# Patient Record
Sex: FEMALE | Race: WHITE | Employment: FULL TIME | URBAN - METROPOLITAN AREA
[De-identification: names, ages, dates, MRNs, and addresses within clinical notes are randomized per-mention and may not be internally consistent; named-entity substitution may affect disease eponyms.]

---

## 2017-05-07 ENCOUNTER — HOSPITAL ENCOUNTER (EMERGENCY)
Age: 62
Discharge: HOME | End: 2017-05-07
Attending: EMERGENCY MEDICINE
Payer: COMMERCIAL

## 2017-05-07 ENCOUNTER — APPOINTMENT (OUTPATIENT)
Dept: ULTRASOUND IMAGING | Age: 62
End: 2017-05-07
Attending: EMERGENCY MEDICINE
Payer: COMMERCIAL

## 2017-05-07 ENCOUNTER — APPOINTMENT (OUTPATIENT)
Dept: GENERAL RADIOLOGY | Age: 62
End: 2017-05-07
Attending: EMERGENCY MEDICINE
Payer: COMMERCIAL

## 2017-05-07 VITALS
OXYGEN SATURATION: 96 % | SYSTOLIC BLOOD PRESSURE: 98 MMHG | DIASTOLIC BLOOD PRESSURE: 60 MMHG | RESPIRATION RATE: 16 BRPM | TEMPERATURE: 99 F | HEART RATE: 104 BPM

## 2017-05-07 DIAGNOSIS — M54.31 SCIATICA OF RIGHT SIDE: ICD-10-CM

## 2017-05-07 DIAGNOSIS — M79.604 RIGHT LEG PAIN: Primary | ICD-10-CM

## 2017-05-07 LAB — D DIMER PPP FEU-MCNC: 1.1 MG/L FEU (ref 0–0.65)

## 2017-05-07 PROCEDURE — 96375 TX/PRO/DX INJ NEW DRUG ADDON: CPT

## 2017-05-07 PROCEDURE — 73562 X-RAY EXAM OF KNEE 3: CPT

## 2017-05-07 PROCEDURE — 85379 FIBRIN DEGRADATION QUANT: CPT | Performed by: EMERGENCY MEDICINE

## 2017-05-07 PROCEDURE — 96374 THER/PROPH/DIAG INJ IV PUSH: CPT

## 2017-05-07 PROCEDURE — 99284 EMERGENCY DEPT VISIT MOD MDM: CPT

## 2017-05-07 PROCEDURE — 74011250636 HC RX REV CODE- 250/636: Performed by: EMERGENCY MEDICINE

## 2017-05-07 PROCEDURE — 93971 EXTREMITY STUDY: CPT

## 2017-05-07 PROCEDURE — 36415 COLL VENOUS BLD VENIPUNCTURE: CPT | Performed by: EMERGENCY MEDICINE

## 2017-05-07 RX ORDER — HYDROMORPHONE HYDROCHLORIDE 2 MG/1
1 TABLET ORAL
Qty: 10 TAB | Refills: 0 | Status: SHIPPED | OUTPATIENT
Start: 2017-05-07

## 2017-05-07 RX ORDER — ONDANSETRON 2 MG/ML
4 INJECTION INTRAMUSCULAR; INTRAVENOUS
Status: COMPLETED | OUTPATIENT
Start: 2017-05-07 | End: 2017-05-07

## 2017-05-07 RX ORDER — MELATONIN
DAILY
COMMUNITY

## 2017-05-07 RX ORDER — ESTRADIOL 0.1 MG/G
2 CREAM VAGINAL DAILY
COMMUNITY

## 2017-05-07 RX ORDER — HYDROMORPHONE HYDROCHLORIDE 1 MG/ML
0.2 INJECTION, SOLUTION INTRAMUSCULAR; INTRAVENOUS; SUBCUTANEOUS
Status: COMPLETED | OUTPATIENT
Start: 2017-05-07 | End: 2017-05-07

## 2017-05-07 RX ORDER — KETOROLAC TROMETHAMINE 30 MG/ML
30 INJECTION, SOLUTION INTRAMUSCULAR; INTRAVENOUS
Status: COMPLETED | OUTPATIENT
Start: 2017-05-07 | End: 2017-05-07

## 2017-05-07 RX ORDER — DIAZEPAM 10 MG/2ML
5 INJECTION INTRAMUSCULAR
Status: COMPLETED | OUTPATIENT
Start: 2017-05-07 | End: 2017-05-07

## 2017-05-07 RX ORDER — AZELASTINE 1 MG/ML
1 SPRAY, METERED NASAL 2 TIMES DAILY
COMMUNITY

## 2017-05-07 RX ORDER — ONDANSETRON 4 MG/1
4 TABLET, FILM COATED ORAL
Qty: 20 TAB | Refills: 0 | Status: SHIPPED | OUTPATIENT
Start: 2017-05-07

## 2017-05-07 RX ADMIN — KETOROLAC TROMETHAMINE 30 MG: 30 INJECTION, SOLUTION INTRAMUSCULAR at 09:38

## 2017-05-07 RX ADMIN — HYDROMORPHONE HYDROCHLORIDE 0.2 MG: 1 INJECTION, SOLUTION INTRAMUSCULAR; INTRAVENOUS; SUBCUTANEOUS at 10:21

## 2017-05-07 RX ADMIN — DIAZEPAM 5 MG: 5 INJECTION, SOLUTION INTRAMUSCULAR; INTRAVENOUS at 09:37

## 2017-05-07 RX ADMIN — ONDANSETRON HYDROCHLORIDE 4 MG: 2 INJECTION, SOLUTION INTRAMUSCULAR; INTRAVENOUS at 10:21

## 2017-05-07 NOTE — LETTER
[place] 
[facility] EMERGENCY DEPT 
[address] [place] [address] [address] [phone] 
[phone] Work/School Note Date: 5/7/2017 To Whom It May concern: 
 
Shiraz Farrar was seen and treated today in the emergency room by the following provider(s): 
Attending Provider: Gem Pickett MD. Shiraz Farrar may return to work on 05/08/2017. Please allow her to use crutches as needed. Sincerely, Gem Pickett MD

## 2017-05-07 NOTE — ED NOTES
patient received discharge instructions from MD and  Prescriptions; VSS; neuro intact; declined wheelchair assistance from ED

## 2017-05-07 NOTE — ED NOTES
Pt resting in bed, friend at bedside, states pain medicine has helped and she is able to straighten left leg more now.

## 2017-05-07 NOTE — ED PROVIDER NOTES
HPI Comments: Fransisco Rashid is a 62 y.o. female who presents ambulatory to [facility] ED with cc of right leg pain extending from the hip down to the right foot. Pt states that she has been walking with a limp for the past three weeks secondary to pain, but notes acute exacerbation of symptoms since last night. She states that the pain has since become \"excruciating 10/10\" and she has been unable to straighten her knee or ambulate. Pt also notes some associated numbness to the right heel. Pt endorses hx of lower back pain and spinal stenosis and states that she is regularly evaluated by Dr. Elvia Clinton, Neurosurgery, with MRI scheduled this week. However, she denies any back pain at this time. She also denies any recent falls, injury, or trauma to the area or any recent surgery or long travel. Pt denies taking any medication for relief of pain and specifically denies any abdominal pain, chest pain, SOB, fever, or chills. Jose Da Silva MD  Neurosurgery: Dr. Elvia Clinton    PMHx: arthritis  Social Hx: - smoking; - EtOH; - illicit drug use    There are no other complains, changes, or physical findings at this time. The history is provided by the patient. No  was used. No past medical history on file. No past surgical history on file. No family history on file. Social History     Social History    Marital status:      Spouse name: N/A    Number of children: N/A    Years of education: N/A     Occupational History    Not on file. Social History Main Topics    Smoking status: Not on file    Smokeless tobacco: Not on file    Alcohol use Not on file    Drug use: Not on file    Sexual activity: Not on file     Other Topics Concern    Not on file     Social History Narrative         ALLERGIES: Codeine; Morphine; and Prednisone    Review of Systems   Constitutional: Negative for chills and fever. HENT: Negative for congestion. Eyes: Negative.     Respiratory: Negative for shortness of breath. Cardiovascular: Negative for chest pain. Gastrointestinal: Negative for abdominal pain. Endocrine: Negative. Genitourinary: Negative for dysuria. Musculoskeletal: Positive for arthralgias, gait problem and myalgias. Negative for back pain. Skin: Negative for rash. Allergic/Immunologic: Negative for immunocompromised state. Neurological: Positive for numbness. Psychiatric/Behavioral: Negative for confusion. Vitals:    05/07/17 0856   BP: 156/90   Pulse: (!) 104   Resp: 16   Temp: 99 °F (37.2 °C)   SpO2: 97%            Physical Exam   Constitutional: She is oriented to person, place, and time. She appears well-developed and well-nourished. She appears distressed (mild to moderate). HENT:   Head: Normocephalic and atraumatic. Eyes: EOM are normal.   Neck: Normal range of motion. Neck supple. Cardiovascular: Normal rate, regular rhythm, normal heart sounds and intact distal pulses. Pulmonary/Chest: Effort normal and breath sounds normal. No respiratory distress. Abdominal: Soft. Bowel sounds are normal. She exhibits no mass. There is no tenderness. Musculoskeletal:   Mild tenderness to the right posterior knee. Moderate tenderness to the right thigh  Mild calf tenderness with no edema  Decreased ROM of the RLE   Neurological: She is alert and oriented to person, place, and time. No sensory deficit. Coordination normal.   Skin: Skin is warm and dry. Psychiatric: She has a normal mood and affect. Nursing note and vitals reviewed.        MDM  Number of Diagnoses or Management Options  Right leg pain:   Sciatica of right side:   Diagnosis management comments: DDx: arthritis, DVT, Baker's cyst, sciatica, musculoskeletal pain, pathological fracture       Amount and/or Complexity of Data Reviewed  Clinical lab tests: ordered and reviewed  Tests in the radiology section of CPT®: ordered and reviewed  Review and summarize past medical records: yes    Patient Progress  Patient progress: stable    ED Course       Procedures    Progress Note:  9:57 AM  Pt states that pain is now down to 8/10. Progress Note:  11:00 AM  Pt states that pain is now a 5/10 and she is able to extend her leg flat. Refusing any more pain medication. Progress Note:  11:44 AM  Pt defers knee immobilizer, but would like crutches. She is also refusing steroids at this time and plans to follow up with her MRI appointment in four days. LABORATORY TESTS:  Recent Results (from the past 12 hour(s))   D DIMER    Collection Time: 05/07/17  9:41 AM   Result Value Ref Range    D-dimer 1.10 (H) 0.00 - 0.65 mg/L FEU       IMAGING RESULTS:  EXAM: XR KNEE RT 3 V     INDICATION: pain. Right leg pain and numbness for 3 weeks, with no reported  trauma.     COMPARISON: None.     FINDINGS: Three views of the right knee demonstrate no fracture or other acute  osseous or articular abnormality. There is no effusion.     IMPRESSION  IMPRESSION: No acute abnormality.                   History: Right leg pain for 3 weeks.     Duplex venous Doppler examination was performed from the right inguinal ligament  to the mid-calf. Deep venous structures were well imaged and appeared  compressible throughout. Both wave form and color flow analysis demonstrated  normal flow patterns. Augmentation was demonstrable.     IMPRESSION  IMPRESSION:  NORMAL DUPLEX VENOUS DOPPLER EXAMINATION.                        VITALS:  Patient Vitals for the past 12 hrs:   Temp Pulse Resp BP SpO2   05/07/17 1045 - - - 106/66 96 %   05/07/17 1015 - - - 108/66 94 %   05/07/17 1000 - - - 108/65 94 %   05/07/17 0915 - - - 121/73 97 %   05/07/17 0856 99 °F (37.2 °C) (!) 104 16 156/90 97 %       MEDICATIONS GIVEN:  Medications   diazePAM (VALIUM) injection 5 mg (5 mg IntraVENous Given 5/7/17 0937)   ketorolac (TORADOL) injection 30 mg (30 mg IntraVENous Given 5/7/17 0938)   HYDROmorphone (PF) (DILAUDID) injection 0.2 mg (0.2 mg IntraVENous Given 5/7/17 1021)   ondansetron (ZOFRAN) injection 4 mg (4 mg IntraVENous Given 5/7/17 1021)       IMPRESSION:  1. Right leg pain    2. Sciatica of right side        PLAN:  1. Current Discharge Medication List      START taking these medications    Details   HYDROmorphone (DILAUDID) 2 mg tablet Take 0.5 Tabs by mouth every four (4) hours as needed for Pain. Max Daily Amount: 6 mg. Qty: 10 Tab, Refills: 0      ondansetron hcl (ZOFRAN, AS HYDROCHLORIDE,) 4 mg tablet Take 1 Tab by mouth every eight (8) hours as needed for Nausea. Qty: 20 Tab, Refills: 0           2. Follow-up Information     Follow up With Details Comments Contact Info    Radha Ye MD  as scheduled [address] [facility] [address] [facility]      Jennifer Mendoza MD In 2 days As needed [address] [facility] [address] [phone]      [facility] EMERGENCY DEPT  If symptoms worsen [address] [facility]  [address] [facility]  [phone]        3. Return to ED if worse     Discharge Note:  11:51 AM  The patient has been re-evaluated and is ready for discharge. Reviewed available results with patient. Counseled patient on diagnosis and care plan. Patient has expressed understanding, and all questions have been answered. Patient agrees with plan and agrees to follow up as recommended, or to return to the ED if their symptoms worsen. Discharge instructions have been provided and explained to the patient, along with reasons to return to the ED. This note is prepared by Karin Gonzalez, acting as Scribe for Governalex Garcia MD.    Governor MD Jose: The scribe's documentation has been prepared under my direction and personally reviewed by me in its entirety. I confirm that the note above accurately reflects all work, treatment, procedures, and medical decision making performed by me.

## 2017-05-07 NOTE — ED NOTES
Received pt to exam room, resting on stretcher in position of comfort, call bell given to pt; pt reports worsening right leg/knee pain x 3 weeks, denies injury, over the last 3 days has been limping and this morning unable to straighten or weight bear on her right leg

## 2017-05-13 ENCOUNTER — HOSPITAL ENCOUNTER (OUTPATIENT)
Dept: MRI IMAGING | Age: 62
Discharge: HOME | End: 2017-05-13
Attending: SPECIALIST
Payer: COMMERCIAL

## 2017-05-13 DIAGNOSIS — R29.898 RIGHT LEG WEAKNESS: ICD-10-CM

## 2017-05-13 DIAGNOSIS — M79.604 RIGHT LEG PAIN: ICD-10-CM

## 2017-05-13 PROCEDURE — 72148 MRI LUMBAR SPINE W/O DYE: CPT

## 2017-06-01 ENCOUNTER — HOSPITAL ENCOUNTER (OUTPATIENT)
Dept: MRI IMAGING | Age: 62
Discharge: HOME | End: 2017-06-01
Attending: ORTHOPAEDIC SURGERY
Payer: COMMERCIAL

## 2017-06-01 DIAGNOSIS — S83.241A ACUTE MEDIAL MENISCUS TEAR OF RIGHT KNEE: ICD-10-CM

## 2017-06-01 PROCEDURE — 73721 MRI JNT OF LWR EXTRE W/O DYE: CPT

## 2018-02-21 ENCOUNTER — HOSPITAL ENCOUNTER (OUTPATIENT)
Dept: ULTRASOUND IMAGING | Age: 63
Discharge: HOME | End: 2018-02-21
Attending: INTERNAL MEDICINE
Payer: COMMERCIAL

## 2018-02-21 DIAGNOSIS — K59.00 CONSTIPATION: ICD-10-CM

## 2018-02-21 DIAGNOSIS — R14.0 BLOATING: ICD-10-CM

## 2018-02-21 DIAGNOSIS — R10.84 GENERALIZED ABDOMINAL PAIN: ICD-10-CM

## 2018-02-21 DIAGNOSIS — R10.9 ABDOMINAL PAIN: ICD-10-CM

## 2018-02-21 DIAGNOSIS — R19.7 DIARRHEA: ICD-10-CM

## 2018-02-21 PROCEDURE — 76700 US EXAM ABDOM COMPLETE: CPT

## 2018-05-31 ENCOUNTER — HOSPITAL ENCOUNTER (OUTPATIENT)
Dept: CT IMAGING | Age: 63
Discharge: HOME | End: 2018-05-31
Attending: INTERNAL MEDICINE
Payer: COMMERCIAL

## 2018-05-31 DIAGNOSIS — K57.92 DIVERTICULITIS: ICD-10-CM

## 2018-05-31 PROCEDURE — 74177 CT ABD & PELVIS W/CONTRAST: CPT

## 2018-05-31 PROCEDURE — 74011636320 HC RX REV CODE- 636/320: Performed by: INTERNAL MEDICINE

## 2018-05-31 PROCEDURE — 74011000258 HC RX REV CODE- 258: Performed by: INTERNAL MEDICINE

## 2018-05-31 RX ORDER — SODIUM CHLORIDE 0.9 % (FLUSH) 0.9 %
10 SYRINGE (ML) INJECTION
Status: COMPLETED | OUTPATIENT
Start: 2018-05-31 | End: 2018-05-31

## 2018-05-31 RX ADMIN — Medication 10 ML: at 10:35

## 2018-05-31 RX ADMIN — SODIUM CHLORIDE 100 ML: 900 INJECTION, SOLUTION INTRAVENOUS at 10:35

## 2018-05-31 RX ADMIN — IOHEXOL 50 ML: 240 INJECTION, SOLUTION INTRATHECAL; INTRAVASCULAR; INTRAVENOUS; ORAL at 09:00

## 2018-05-31 RX ADMIN — IOPAMIDOL 100 ML: 755 INJECTION, SOLUTION INTRAVENOUS at 10:35

## 2023-05-19 RX ORDER — HYDROMORPHONE HYDROCHLORIDE 2 MG/1
1 TABLET ORAL EVERY 4 HOURS PRN
COMMUNITY
Start: 2017-05-07

## 2023-05-19 RX ORDER — ONDANSETRON 4 MG/1
4 TABLET, FILM COATED ORAL EVERY 8 HOURS PRN
COMMUNITY
Start: 2017-05-07

## 2023-05-19 RX ORDER — AZELASTINE 1 MG/ML
1 SPRAY, METERED NASAL 2 TIMES DAILY
COMMUNITY

## 2023-05-19 RX ORDER — ESTRADIOL 0.1 MG/G
2 CREAM VAGINAL DAILY
COMMUNITY

## 2024-12-03 ENCOUNTER — HOSPITAL ENCOUNTER (OUTPATIENT)
Age: 69
Discharge: HOME | End: 2024-12-06
Payer: COMMERCIAL

## 2024-12-03 DIAGNOSIS — R14.0 BLOATING: ICD-10-CM

## 2024-12-03 PROCEDURE — 74018 RADEX ABDOMEN 1 VIEW: CPT

## 2025-06-17 ENCOUNTER — TRANSCRIBE ORDERS (OUTPATIENT)
Facility: HOSPITAL | Age: 70
End: 2025-06-17

## 2025-06-17 DIAGNOSIS — M21.962 ACQUIRED DEFORMITY OF LEFT KNEE: Primary | ICD-10-CM

## 2025-06-23 ENCOUNTER — HOSPITAL ENCOUNTER (OUTPATIENT)
Facility: HOSPITAL | Age: 70
Discharge: HOME | End: 2025-06-26
Attending: ORTHOPAEDIC SURGERY
Payer: COMMERCIAL

## 2025-06-23 DIAGNOSIS — M21.962 ACQUIRED DEFORMITY OF LEFT KNEE: ICD-10-CM

## 2025-06-23 PROCEDURE — 73700 CT LOWER EXTREMITY W/O DYE: CPT
